# Patient Record
Sex: FEMALE | Race: WHITE | NOT HISPANIC OR LATINO
[De-identification: names, ages, dates, MRNs, and addresses within clinical notes are randomized per-mention and may not be internally consistent; named-entity substitution may affect disease eponyms.]

---

## 2019-11-09 ENCOUNTER — APPOINTMENT (OUTPATIENT)
Dept: OBGYN | Facility: CLINIC | Age: 29
End: 2019-11-09
Payer: COMMERCIAL

## 2019-11-09 PROCEDURE — 99385 PREV VISIT NEW AGE 18-39: CPT

## 2019-11-09 PROCEDURE — 81025 URINE PREGNANCY TEST: CPT

## 2019-11-09 PROCEDURE — 36415 COLL VENOUS BLD VENIPUNCTURE: CPT

## 2019-11-20 PROBLEM — Z00.00 ENCOUNTER FOR PREVENTIVE HEALTH EXAMINATION: Status: ACTIVE | Noted: 2019-11-20

## 2019-11-21 ENCOUNTER — APPOINTMENT (OUTPATIENT)
Dept: OBGYN | Facility: CLINIC | Age: 29
End: 2019-11-21
Payer: COMMERCIAL

## 2019-11-21 PROCEDURE — 0502F SUBSEQUENT PRENATAL CARE: CPT

## 2019-11-26 ENCOUNTER — APPOINTMENT (OUTPATIENT)
Dept: OBGYN | Facility: CLINIC | Age: 29
End: 2019-11-26

## 2019-12-03 ENCOUNTER — APPOINTMENT (OUTPATIENT)
Dept: OBGYN | Facility: CLINIC | Age: 29
End: 2019-12-03
Payer: COMMERCIAL

## 2019-12-03 ENCOUNTER — APPOINTMENT (OUTPATIENT)
Dept: ANTEPARTUM | Facility: CLINIC | Age: 29
End: 2019-12-03

## 2019-12-03 PROCEDURE — 0502F SUBSEQUENT PRENATAL CARE: CPT

## 2020-01-04 ENCOUNTER — APPOINTMENT (OUTPATIENT)
Dept: ANTEPARTUM | Facility: CLINIC | Age: 30
End: 2020-01-04
Payer: COMMERCIAL

## 2020-01-04 PROCEDURE — 76817 TRANSVAGINAL US OBSTETRIC: CPT | Mod: 59

## 2020-01-04 PROCEDURE — 76811 OB US DETAILED SNGL FETUS: CPT

## 2020-02-03 ENCOUNTER — APPOINTMENT (OUTPATIENT)
Dept: OBGYN | Facility: CLINIC | Age: 30
End: 2020-02-03

## 2020-02-25 ENCOUNTER — APPOINTMENT (OUTPATIENT)
Dept: OBGYN | Facility: CLINIC | Age: 30
End: 2020-02-25

## 2020-03-04 ENCOUNTER — OUTPATIENT (OUTPATIENT)
Dept: INPATIENT UNIT | Facility: HOSPITAL | Age: 30
LOS: 1 days | Discharge: HOME | End: 2020-03-04

## 2020-03-04 VITALS
TEMPERATURE: 98 F | OXYGEN SATURATION: 99 % | SYSTOLIC BLOOD PRESSURE: 112 MMHG | RESPIRATION RATE: 16 BRPM | HEART RATE: 84 BPM | DIASTOLIC BLOOD PRESSURE: 57 MMHG

## 2020-03-04 VITALS — DIASTOLIC BLOOD PRESSURE: 58 MMHG | SYSTOLIC BLOOD PRESSURE: 103 MMHG | HEART RATE: 79 BPM

## 2020-03-04 DIAGNOSIS — O26.899 OTHER SPECIFIED PREGNANCY RELATED CONDITIONS, UNSPECIFIED TRIMESTER: ICD-10-CM

## 2020-03-04 DIAGNOSIS — Z3A.00 WEEKS OF GESTATION OF PREGNANCY NOT SPECIFIED: ICD-10-CM

## 2020-03-04 LAB
APPEARANCE UR: SIGNIFICANT CHANGE UP
APTT BLD: 27.7 SEC — SIGNIFICANT CHANGE UP (ref 27.5–36.3)
BACTERIA # UR AUTO: HIGH
BASOPHILS # BLD AUTO: 0.04 K/UL — SIGNIFICANT CHANGE UP (ref 0–0.2)
BASOPHILS NFR BLD AUTO: 0.3 % — SIGNIFICANT CHANGE UP (ref 0–2)
BILIRUB UR-MCNC: NEGATIVE — SIGNIFICANT CHANGE UP
BLOOD UR QL VISUAL: NEGATIVE — SIGNIFICANT CHANGE UP
COLOR SPEC: YELLOW — SIGNIFICANT CHANGE UP
EOSINOPHIL # BLD AUTO: 0.29 K/UL — SIGNIFICANT CHANGE UP (ref 0–0.5)
EOSINOPHIL NFR BLD AUTO: 2.3 % — SIGNIFICANT CHANGE UP (ref 0–6)
FIBRINOGEN PPP-MCNC: 682 MG/DL — HIGH (ref 350–510)
GLUCOSE UR-MCNC: NEGATIVE — SIGNIFICANT CHANGE UP
HCT VFR BLD CALC: 36.2 % — SIGNIFICANT CHANGE UP (ref 34.5–45)
HGB BLD-MCNC: 11.7 G/DL — SIGNIFICANT CHANGE UP (ref 11.5–15.5)
IMM GRANULOCYTES NFR BLD AUTO: 0.5 % — SIGNIFICANT CHANGE UP (ref 0–1.5)
INR BLD: 0.93 — SIGNIFICANT CHANGE UP (ref 0.88–1.17)
KETONES UR-MCNC: NEGATIVE — SIGNIFICANT CHANGE UP
LEUKOCYTE ESTERASE UR-ACNC: SIGNIFICANT CHANGE UP
LYMPHOCYTES # BLD AUTO: 17.9 % — SIGNIFICANT CHANGE UP (ref 13–44)
LYMPHOCYTES # BLD AUTO: 2.28 K/UL — SIGNIFICANT CHANGE UP (ref 1–3.3)
MCHC RBC-ENTMCNC: 29.7 PG — SIGNIFICANT CHANGE UP (ref 27–34)
MCHC RBC-ENTMCNC: 32.3 % — SIGNIFICANT CHANGE UP (ref 32–36)
MCV RBC AUTO: 91.9 FL — SIGNIFICANT CHANGE UP (ref 80–100)
MONOCYTES # BLD AUTO: 0.9 K/UL — SIGNIFICANT CHANGE UP (ref 0–0.9)
MONOCYTES NFR BLD AUTO: 7.1 % — SIGNIFICANT CHANGE UP (ref 2–14)
NEUTROPHILS # BLD AUTO: 9.19 K/UL — HIGH (ref 1.8–7.4)
NEUTROPHILS NFR BLD AUTO: 71.9 % — SIGNIFICANT CHANGE UP (ref 43–77)
NITRITE UR-MCNC: NEGATIVE — SIGNIFICANT CHANGE UP
NRBC # FLD: 0 K/UL — SIGNIFICANT CHANGE UP (ref 0–0)
PH UR: 6.5 — SIGNIFICANT CHANGE UP (ref 5–8)
PLATELET # BLD AUTO: 258 K/UL — SIGNIFICANT CHANGE UP (ref 150–400)
PMV BLD: 10.5 FL — SIGNIFICANT CHANGE UP (ref 7–13)
PROT UR-MCNC: 20 — SIGNIFICANT CHANGE UP
PROTHROM AB SERPL-ACNC: 10.7 SEC — SIGNIFICANT CHANGE UP (ref 9.8–13.1)
RBC # BLD: 3.94 M/UL — SIGNIFICANT CHANGE UP (ref 3.8–5.2)
RBC # FLD: 13.7 % — SIGNIFICANT CHANGE UP (ref 10.3–14.5)
RBC CASTS # UR COMP ASSIST: SIGNIFICANT CHANGE UP (ref 0–?)
SP GR SPEC: 1.02 — SIGNIFICANT CHANGE UP (ref 1–1.04)
SQUAMOUS # UR AUTO: SIGNIFICANT CHANGE UP
UROBILINOGEN FLD QL: NORMAL — SIGNIFICANT CHANGE UP
WBC # BLD: 12.76 K/UL — HIGH (ref 3.8–10.5)
WBC # FLD AUTO: 12.76 K/UL — HIGH (ref 3.8–10.5)
WBC UR QL: HIGH (ref 0–?)

## 2020-03-04 RX ORDER — SODIUM CHLORIDE 9 MG/ML
1000 INJECTION, SOLUTION INTRAVENOUS ONCE
Refills: 0 | Status: DISCONTINUED | OUTPATIENT
Start: 2020-03-04 | End: 2020-03-19

## 2020-03-04 NOTE — OB PROVIDER TRIAGE NOTE - NSHPLABSRESULTS_GEN_ALL_CORE
Complete Blood Count + Automated Diff (03.04.20 @ 17:09)    Nucleated RBC #: 0 K/uL    WBC Count: 12.76 K/uL    RBC Count: 3.94 M/uL    Hemoglobin: 11.7 g/dL    Hematocrit: 36.2 %    Mean Cell Volume: 91.9 fL    Mean Cell Hemoglobin: 29.7 pg    Mean Cell Hemoglobin Conc: 32.3 %    Red Cell Distrib Width: 13.7 %    Platelet Count - Automated: 258 K/uL    MPV: 10.5 fl    Auto Neutrophil #: 9.19 K/uL    Auto Lymphocyte #: 2.28 K/uL    Auto Monocyte #: 0.90 K/uL    Auto Eosinophil #: 0.29 K/uL    Auto Basophil #: 0.04 K/uL    Auto Neutrophil %: 71.9 %    Auto Lymphocyte %: 17.9 %    Auto Monocyte %: 7.1 %    Auto Eosinophil %: 2.3 %    Auto Basophil %: 0.3 %    Auto Immature Granulocyte %: 0.5: (Includes meta, myelo and promyelocytes) %    Fibrinogen Assay (03.04.20 @ 17:09)    Fibrinogen Assay: 682.0 mg/dL    Blood Type = B Positive

## 2020-03-04 NOTE — OB PROVIDER TRIAGE NOTE - NSHPPHYSICALEXAM_GEN_ALL_CORE
A/O x 3  NAD  ICU Vital Signs Last 24 Hrs  T(C): 36.8 (04 Mar 2020 15:41), Max: 36.8 (04 Mar 2020 15:41)  T(F): 98.24 (04 Mar 2020 15:41), Max: 98.24 (04 Mar 2020 15:41)  HR: 82 (04 Mar 2020 16:46) (78 - 84)  BP: 92/54 (04 Mar 2020 16:46) (92/54 - 112/57)  BP(mean): --  ABP: --  ABP(mean): --  RR: 16 (04 Mar 2020 14:51) (16 - 16)  SpO2: 99% (04 Mar 2020 14:51) (99% - 99%)  Abdomen is soft NT and gravid with no ctx palpable  SSE:   FHR Initially: Cat 1 @ 1700  (135 baseline, mod variability , accels, no decels)  TOCO: No CTX  Transabdominal ultrasound at the bedside performed , images printed and results: A/O x 3  NAD  ICU Vital Signs Last 24 Hrs  T(C): 36.8 (04 Mar 2020 15:41), Max: 36.8 (04 Mar 2020 15:41)  T(F): 98.24 (04 Mar 2020 15:41), Max: 98.24 (04 Mar 2020 15:41)  HR: 82 (04 Mar 2020 16:46) (78 - 84)  BP: 92/54 (04 Mar 2020 16:46) (92/54 - 112/57)  BP(mean): --  ABP: --  ABP(mean): --  RR: 16 (04 Mar 2020 14:51) (16 - 16)  SpO2: 99% (04 Mar 2020 14:51) (99% - 99%)  Abdomen is soft NT and gravid with no ctx palpable  SSE:   FHR Initially: Cat 1 @ 1700  (135 baseline, mod variability , accels, no decels)  TOCO: No CTX A/O x 3  NAD  ICU Vital Signs Last 24 Hrs  T(C): 36.8 (04 Mar 2020 15:41), Max: 36.8 (04 Mar 2020 15:41)  T(F): 98.24 (04 Mar 2020 15:41), Max: 98.24 (04 Mar 2020 15:41)  HR: 82 (04 Mar 2020 16:46) (78 - 84)  BP: 92/54 (04 Mar 2020 16:46) (92/54 - 112/57)  BP(mean): --  ABP: --  ABP(mean): --  RR: 16 (04 Mar 2020 14:51) (16 - 16)  SpO2: 99% (04 Mar 2020 14:51) (99% - 99%)  Abdomen is soft NT and gravid with no ctx palpable  SSE: no pooling, cervix appears L/C  FHR Initially: Cat 1 @ 1700  (135 baseline, mod variability , accels, no decels)  TOCO: No CTX  TAS: VTX,  Anterior placenta, IRAM with MVP = 7.19, BPP 8/8

## 2020-03-04 NOTE — OB PROVIDER TRIAGE NOTE - NSOBPROVIDERNOTE_OBGYN_ALL_OB_FT
28 yo @ 28.5 GA ,   female presents with c/o of a slip and fall for 4 hours of continued observation on NST  - Labs CBC, Fibrinogen   - Blood Type:  B Positive   - BPP to be performed 28 yo @ 28.5 GA ,  [place] female presents with c/o of a slip and fall for 4 hours of continued observation on NST  - Labs CBC, Fibrinogen   - Blood Type:  B Positive   - BPP   - For continued observation 28 yo @ 28.5 GA ,  Mex[place] female presents with c/o of a slip and fall for 4 hours of continued observation on NST  - Labs CBC, Fibrinogen   - Blood Type:  B Positive   - BPP   - For continued observation  ______________________________________________________________  735pm    Pt received from Alberto,NANCY    Pt is a 30yo  at 28.5wks, presents to triage s/p fall. Pt denies any abd trauma. Pt was evaluated on day tour by [facility]. Pt reports abd pain at this time 7/10. Denies any vaginal bleeding or leakage of fluid. Reports good fetal movement. Denies any AP complications.    VS: BP 82/46, 85/60; pt denies feeling lightheaded or dizzy  Lungs clear bilaterally, normal HR and rhythm  Abd soft, nontender, gravid    -Coags sent  -UA  -IV hydration 1L ordered    TVUS:  NST: , moderate variability, accels, no decels  No ctx seen on toco  -Will continue to monitor 30 yo @ 28.5 GA ,  Australian female presents with c/o of a slip and fall for 4 hours of continued observation on NST  - Labs CBC, Fibrinogen   - Blood Type:  B Positive   - BPP   - For continued observation  ______________________________________________________________  735pm    Pt received from Alberto,NANCY    Pt is a 30yo  at 28.5wks, presents to triage s/p fall. Pt denies any abd trauma. [facility] Pt reports abd pain at this time 7/10. Denies any vaginal bleeding or leakage of fluid. Reports good fetal movement. Denies any AP complications.    VS: BP 82/46, 85/60; pt denies feeling lightheaded or dizzy  Lungs clear bilaterally, normal HR and rhythm  Abd soft, nontender, gravid    -Coags sent  -UA  -IV hydration 1L ordered    SSE: cervix appears closed; no blood noted in vault  TVUS: cervical length 4.45cm-4.66cm; no funneling or dynamic changes noted  NST: , moderate variability, accels, no decels  No ctx seen on toco  -Will continue to monitor 28 yo @ 28.5 GA ,   female presents with c/o of a slip and fall for 4 hours of continued observation on NST  - Labs CBC, Fibrinogen   - Blood Type:  B Positive   - BPP /  - For continued observation  ______________________________________________________________  735pm    Pt received from NANCY Dominguez    Pt is a 30yo  at 28.5wks, presents to triage s/p fall. Pt denies any abd trauma. [facility] Pt reports abd pain at this time 7/10. Denies any vaginal bleeding or leakage of fluid. Reports good fetal movement. Denies any AP complications.    VS: BP 82/46, 85/60; pt denies feeling lightheaded or dizzy  Lungs clear bilaterally, normal HR and rhythm  Abd soft, nontender, gravid    -Coags sent  -UA  -IV hydration 1L ordered    SSE: cervix appears closed; no blood noted in vault  TVUS: cervical length 4.45cm-4.66cm; no funneling or dynamic changes noted  NST: , moderate variability, accels, no decels  No ctx seen on toco  -Will continue to monitor    845pm    BP now: 103/58, HR 79    Lab Results:    -No evidence of fetal or maternal distress at this time  -All findings d/w Dr. Adams  -Pt cleared for discharge and to f/u with Dr. Adams as scheduled  -Pt to call MD or return to hospital if contractions, leakage of fluid, vaginal bleeding or decreased fetal movement 28 yo @ 28.5 GA ,   female presents with c/o of a slip and fall for 4 hours of continued observation on NST  - Labs CBC, Fibrinogen   - Blood Type:  B Positive   - BPP /  - For continued observation  ______________________________________________________________  735pm    Pt received from NANCY Dominguez    Pt is a 28yo  at 28.5wks, presents to triage s/p fall. Pt denies any abd trauma. [facility] Pt reports abd pain at this time 7/10. Denies any vaginal bleeding or leakage of fluid. Reports good fetal movement. Denies any AP complications.    VS: BP 82/46, 85/60; pt denies feeling lightheaded or dizzy  Lungs clear bilaterally, normal HR and rhythm  Abd soft, nontender, gravid    -Coags sent  -UA  -IV hydration 1L ordered    SSE: cervix appears closed; no blood noted in vault  TVUS: cervical length 4.45cm-4.66cm; no funneling or dynamic changes noted  NST: , moderate variability, accels, no decels  No ctx seen on toco  -Will continue to monitor    845pm    BP now: 103/58, HR 79      -No evidence of fetal or maternal distress at this time  -All findings d/w Dr. Adams  -Pt cleared for discharge and to f/u with Dr. Adams as scheduled  -Pt to call MD or return to hospital if contractions, leakage of fluid, vaginal bleeding or decreased fetal movement

## 2020-03-04 NOTE — OB PROVIDER TRIAGE NOTE - ADDITIONAL INSTRUCTIONS
-No evidence of fetal or maternal distress at this time  -All findings d/w Dr. Adams  -Pt cleared for discharge and to f/u with Dr. Adams as scheduled  -Pt to call MD or return to hospital if contractions, leakage of fluid, vaginal bleeding or decreased fetal movement

## 2020-03-04 NOTE — OB PROVIDER TRIAGE NOTE - HISTORY OF PRESENT ILLNESS
28 yo   female presents with c/o of a slip and fall on her buttocks while in a shower bath sitting on a stool and slipped hitting her buttocks. Patient states she did not hit her abdomen and denies any VB, LOF, Cramping or ctx since fall. States good FM's.   PNC: Dr Adams  Blood Type = B Positive (19)  Denies AP complications thus far

## 2020-03-06 LAB
CULTURE RESULTS: SIGNIFICANT CHANGE UP
SPECIMEN SOURCE: SIGNIFICANT CHANGE UP

## 2020-03-14 ENCOUNTER — APPOINTMENT (OUTPATIENT)
Dept: ANTEPARTUM | Facility: CLINIC | Age: 30
End: 2020-03-14
Payer: COMMERCIAL

## 2020-03-14 PROCEDURE — 76816 OB US FOLLOW-UP PER FETUS: CPT

## 2020-03-14 PROCEDURE — 76819 FETAL BIOPHYS PROFIL W/O NST: CPT

## 2020-03-25 PROBLEM — J32.9 CHRONIC SINUSITIS, UNSPECIFIED: Chronic | Status: ACTIVE | Noted: 2020-03-04

## 2020-03-25 PROBLEM — D64.9 ANEMIA, UNSPECIFIED: Chronic | Status: ACTIVE | Noted: 2020-03-04

## 2020-03-25 PROBLEM — O03.9 COMPLETE OR UNSPECIFIED SPONTANEOUS ABORTION WITHOUT COMPLICATION: Chronic | Status: ACTIVE | Noted: 2020-03-04

## 2020-03-31 ENCOUNTER — APPOINTMENT (OUTPATIENT)
Dept: OBGYN | Facility: CLINIC | Age: 30
End: 2020-03-31

## 2020-04-14 ENCOUNTER — APPOINTMENT (OUTPATIENT)
Dept: OBGYN | Facility: CLINIC | Age: 30
End: 2020-04-14

## 2020-04-24 ENCOUNTER — APPOINTMENT (OUTPATIENT)
Dept: OBGYN | Facility: CLINIC | Age: 30
End: 2020-04-24

## 2020-05-08 ENCOUNTER — APPOINTMENT (OUTPATIENT)
Dept: OBGYN | Facility: CLINIC | Age: 30
End: 2020-05-08

## 2020-05-12 ENCOUNTER — APPOINTMENT (OUTPATIENT)
Dept: OBGYN | Facility: CLINIC | Age: 30
End: 2020-05-12

## 2020-05-19 ENCOUNTER — APPOINTMENT (OUTPATIENT)
Dept: OBGYN | Facility: CLINIC | Age: 30
End: 2020-05-19
Payer: COMMERCIAL

## 2020-05-19 PROCEDURE — 0502F SUBSEQUENT PRENATAL CARE: CPT

## 2020-05-22 ENCOUNTER — TRANSCRIPTION ENCOUNTER (OUTPATIENT)
Age: 30
End: 2020-05-22

## 2020-05-22 ENCOUNTER — APPOINTMENT (OUTPATIENT)
Dept: OBGYN | Facility: CLINIC | Age: 30
End: 2020-05-22
Payer: COMMERCIAL

## 2020-05-22 ENCOUNTER — INPATIENT (INPATIENT)
Facility: HOSPITAL | Age: 30
LOS: 3 days | Discharge: HOME | End: 2020-05-26
Attending: OBSTETRICS & GYNECOLOGY | Admitting: OBSTETRICS & GYNECOLOGY
Payer: COMMERCIAL

## 2020-05-22 VITALS
RESPIRATION RATE: 16 BRPM | TEMPERATURE: 99 F | HEART RATE: 96 BPM | DIASTOLIC BLOOD PRESSURE: 58 MMHG | SYSTOLIC BLOOD PRESSURE: 120 MMHG

## 2020-05-22 DIAGNOSIS — O26.899 OTHER SPECIFIED PREGNANCY RELATED CONDITIONS, UNSPECIFIED TRIMESTER: ICD-10-CM

## 2020-05-22 DIAGNOSIS — Z3A.00 WEEKS OF GESTATION OF PREGNANCY NOT SPECIFIED: ICD-10-CM

## 2020-05-22 LAB
BASOPHILS # BLD AUTO: 0.03 K/UL — SIGNIFICANT CHANGE UP (ref 0–0.2)
BASOPHILS NFR BLD AUTO: 0.2 % — SIGNIFICANT CHANGE UP (ref 0–2)
BLD GP AB SCN SERPL QL: NEGATIVE — SIGNIFICANT CHANGE UP
EOSINOPHIL # BLD AUTO: 0.14 K/UL — SIGNIFICANT CHANGE UP (ref 0–0.5)
EOSINOPHIL NFR BLD AUTO: 1.1 % — SIGNIFICANT CHANGE UP (ref 0–6)
HCT VFR BLD CALC: 39.5 % — SIGNIFICANT CHANGE UP (ref 34.5–45)
HGB BLD-MCNC: 12.9 G/DL — SIGNIFICANT CHANGE UP (ref 11.5–15.5)
IMM GRANULOCYTES NFR BLD AUTO: 0.5 % — SIGNIFICANT CHANGE UP (ref 0–1.5)
LYMPHOCYTES # BLD AUTO: 18.9 % — SIGNIFICANT CHANGE UP (ref 13–44)
LYMPHOCYTES # BLD AUTO: 2.33 K/UL — SIGNIFICANT CHANGE UP (ref 1–3.3)
MCHC RBC-ENTMCNC: 29 PG — SIGNIFICANT CHANGE UP (ref 27–34)
MCHC RBC-ENTMCNC: 32.7 % — SIGNIFICANT CHANGE UP (ref 32–36)
MCV RBC AUTO: 88.8 FL — SIGNIFICANT CHANGE UP (ref 80–100)
MONOCYTES # BLD AUTO: 0.88 K/UL — SIGNIFICANT CHANGE UP (ref 0–0.9)
MONOCYTES NFR BLD AUTO: 7.1 % — SIGNIFICANT CHANGE UP (ref 2–14)
NEUTROPHILS # BLD AUTO: 8.9 K/UL — HIGH (ref 1.8–7.4)
NEUTROPHILS NFR BLD AUTO: 72.2 % — SIGNIFICANT CHANGE UP (ref 43–77)
NRBC # FLD: 0 K/UL — SIGNIFICANT CHANGE UP (ref 0–0)
PLATELET # BLD AUTO: 284 K/UL — SIGNIFICANT CHANGE UP (ref 150–400)
PMV BLD: 10.4 FL — SIGNIFICANT CHANGE UP (ref 7–13)
RBC # BLD: 4.45 M/UL — SIGNIFICANT CHANGE UP (ref 3.8–5.2)
RBC # FLD: 13.3 % — SIGNIFICANT CHANGE UP (ref 10.3–14.5)
RH IG SCN BLD-IMP: POSITIVE — SIGNIFICANT CHANGE UP
RH IG SCN BLD-IMP: POSITIVE — SIGNIFICANT CHANGE UP
WBC # BLD: 12.34 K/UL — HIGH (ref 3.8–10.5)
WBC # FLD AUTO: 12.34 K/UL — HIGH (ref 3.8–10.5)

## 2020-05-22 PROCEDURE — 59510 CESAREAN DELIVERY: CPT

## 2020-05-22 PROCEDURE — 76818 FETAL BIOPHYS PROFILE W/NST: CPT

## 2020-05-22 RX ORDER — OXYTOCIN 10 UNIT/ML
333.33 VIAL (ML) INJECTION
Qty: 20 | Refills: 0 | Status: DISCONTINUED | OUTPATIENT
Start: 2020-05-22 | End: 2020-05-23

## 2020-05-22 RX ORDER — AMPICILLIN TRIHYDRATE 250 MG
2 CAPSULE ORAL ONCE
Refills: 0 | Status: COMPLETED | OUTPATIENT
Start: 2020-05-22 | End: 2020-05-22

## 2020-05-22 RX ORDER — AMPICILLIN TRIHYDRATE 250 MG
1 CAPSULE ORAL EVERY 4 HOURS
Refills: 0 | Status: DISCONTINUED | OUTPATIENT
Start: 2020-05-22 | End: 2020-05-23

## 2020-05-22 RX ORDER — SODIUM CHLORIDE 9 MG/ML
1000 INJECTION, SOLUTION INTRAVENOUS
Refills: 0 | Status: DISCONTINUED | OUTPATIENT
Start: 2020-05-22 | End: 2020-05-23

## 2020-05-22 RX ADMIN — Medication 216 GRAM(S): at 23:05

## 2020-05-22 NOTE — OB PROVIDER H&P - ASSESSMENT
pmh: sinusitis   anemia  psh: denies  obhx: SAB x1  gynhx: denies    NKDA    Medications   PNV    Assessment  Vital Signs Last 24 Hrs  T(C): 37.0 (22 May 2020 18:24), Max: 37 (22 May 2020 18:10)  T(F): 98.6 (22 May 2020 18:24), Max: 98.6 (22 May 2020 18:10)  HR: 82 (22 May 2020 20:21) (82 - 96)  BP: 110/60 (22 May 2020 20:21) (95/52 - 120/58)  BP(mean): --  RR: 16 (22 May 2020 18:10) (16 - 16)  SpO2: --    regular heart rate; rhythm   lungs CTA     abdomen soft nontender gravid  (+) FHR; moderate variability; with accelerations; occasional late decelerations noted   irregular uterine contractions noted on tocometer    Vaginal Exam: closed     Biophysical Profile: 8/8  cephalic; anterior   Amniotic Fluid Index: 13.1cm    Plan  Admit to Labor and Delivery for Induction of Labor for Category 2 tracing  Routine Admission Labs  PO Cytotec for Induction of Labor  COVID 19 Swab Pending      D/w Dr. Shelby & Dr. Castañeda PGY-3

## 2020-05-22 NOTE — OB PROVIDER H&P - HISTORY OF PRESENT ILLNESS
29 year old  female  at 40.4 weeks gestation who was sent from office for Nonreactive NST and decreased fm  today   denied any ap issues denied any fetal issues   denied any covid 19 exposure or symptoms     med hx  anemia   surghx  denied  meds pnvqd  Ob hx SAB x1    gyn hx denied

## 2020-05-22 NOTE — OB PROVIDER TRIAGE NOTE - NSHPPHYSICALEXAM_GEN_ALL_CORE
pt seen upon arrival      placed on NST pt seen upon arrival       1830p placed on NST   for prolonged NST

## 2020-05-22 NOTE — OB PROVIDER TRIAGE NOTE - NSOBPROVIDERNOTE_OBGYN_ALL_OB_FT
29 year old female P0 at 40.4 weeks NR NST at office  decreased FM 29 year old female P0 at 40.4 weeks NR NST at office  decreased FM  placed on NST in triage 1830   awaiting prolonged NST 29 year old female P0 at 40.4 weeks NR NST at office  decreased FM  placed on NST in triage 1830   awaiting prolonged NST    addendum; 2030  admit to labor and delivery for category 2 tracing

## 2020-05-22 NOTE — OB PROVIDER H&P - NSHPPHYSICALEXAM_GEN_ALL_CORE
Vital Signs Last 24 Hrs  T(C): 37.0 (22 May 2020 18:24), Max: 37 (22 May 2020 18:10)  T(F): 98.6 (22 May 2020 18:24), Max: 98.6 (22 May 2020 18:10)  HR: 82 (22 May 2020 20:21) (82 - 96)  BP: 110/60 (22 May 2020 20:21) (95/52 - 120/58)  BP(mean): --  RR: 16 (22 May 2020 18:10) (16 - 16)  SpO2: --    regular heart rate; rhythm   lungs CTA     abdomen soft nontender gravid  (+) FHR; moderate variability; with accelerations; occasional late decelerations noted   irregular uterine contractions noted on tocometer    Vaginal Exam: closed     Biophysical Profile: 8/8  cephalic; anterior   Amniotic Fluid Index: 13.1cm

## 2020-05-23 ENCOUNTER — TRANSCRIPTION ENCOUNTER (OUTPATIENT)
Age: 30
End: 2020-05-23

## 2020-05-23 ENCOUNTER — RESULT REVIEW (OUTPATIENT)
Age: 30
End: 2020-05-23

## 2020-05-23 PROCEDURE — 88307 TISSUE EXAM BY PATHOLOGIST: CPT | Mod: 26

## 2020-05-23 RX ORDER — ONDANSETRON 8 MG/1
4 TABLET, FILM COATED ORAL EVERY 6 HOURS
Refills: 0 | Status: DISCONTINUED | OUTPATIENT
Start: 2020-05-23 | End: 2020-05-26

## 2020-05-23 RX ORDER — FAMOTIDINE 10 MG/ML
20 INJECTION INTRAVENOUS ONCE
Refills: 0 | Status: COMPLETED | OUTPATIENT
Start: 2020-05-23 | End: 2020-05-23

## 2020-05-23 RX ORDER — ACETAMINOPHEN 500 MG
975 TABLET ORAL
Refills: 0 | Status: DISCONTINUED | OUTPATIENT
Start: 2020-05-23 | End: 2020-05-26

## 2020-05-23 RX ORDER — IBUPROFEN 200 MG
600 TABLET ORAL EVERY 6 HOURS
Refills: 0 | Status: COMPLETED | OUTPATIENT
Start: 2020-05-23 | End: 2021-04-21

## 2020-05-23 RX ORDER — OXYTOCIN 10 UNIT/ML
333.33 VIAL (ML) INJECTION
Qty: 20 | Refills: 0 | Status: COMPLETED | OUTPATIENT
Start: 2020-05-23 | End: 2020-05-23

## 2020-05-23 RX ORDER — BUTORPHANOL TARTRATE 2 MG/ML
2 INJECTION, SOLUTION INTRAMUSCULAR; INTRAVENOUS ONCE
Refills: 0 | Status: DISCONTINUED | OUTPATIENT
Start: 2020-05-23 | End: 2020-05-23

## 2020-05-23 RX ORDER — DIPHENHYDRAMINE HCL 50 MG
25 CAPSULE ORAL EVERY 6 HOURS
Refills: 0 | Status: DISCONTINUED | OUTPATIENT
Start: 2020-05-23 | End: 2020-05-26

## 2020-05-23 RX ORDER — MORPHINE SULFATE 50 MG/1
3 CAPSULE, EXTENDED RELEASE ORAL ONCE
Refills: 0 | Status: DISCONTINUED | OUTPATIENT
Start: 2020-05-23 | End: 2020-05-26

## 2020-05-23 RX ORDER — TETANUS TOXOID, REDUCED DIPHTHERIA TOXOID AND ACELLULAR PERTUSSIS VACCINE, ADSORBED 5; 2.5; 8; 8; 2.5 [IU]/.5ML; [IU]/.5ML; UG/.5ML; UG/.5ML; UG/.5ML
0.5 SUSPENSION INTRAMUSCULAR ONCE
Refills: 0 | Status: DISCONTINUED | OUTPATIENT
Start: 2020-05-23 | End: 2020-05-26

## 2020-05-23 RX ORDER — OXYCODONE HYDROCHLORIDE 5 MG/1
5 TABLET ORAL ONCE
Refills: 0 | Status: DISCONTINUED | OUTPATIENT
Start: 2020-05-23 | End: 2020-05-26

## 2020-05-23 RX ORDER — OXYTOCIN 10 UNIT/ML
333.33 VIAL (ML) INJECTION
Qty: 20 | Refills: 0 | Status: DISCONTINUED | OUTPATIENT
Start: 2020-05-23 | End: 2020-05-26

## 2020-05-23 RX ORDER — AMPICILLIN TRIHYDRATE 250 MG
1 CAPSULE ORAL EVERY 4 HOURS
Refills: 0 | Status: DISCONTINUED | OUTPATIENT
Start: 2020-05-23 | End: 2020-05-23

## 2020-05-23 RX ORDER — CITRIC ACID/SODIUM CITRATE 300-500 MG
30 SOLUTION, ORAL ORAL ONCE
Refills: 0 | Status: COMPLETED | OUTPATIENT
Start: 2020-05-23 | End: 2020-05-23

## 2020-05-23 RX ORDER — OXYCODONE HYDROCHLORIDE 5 MG/1
5 TABLET ORAL
Refills: 0 | Status: DISCONTINUED | OUTPATIENT
Start: 2020-05-23 | End: 2020-05-26

## 2020-05-23 RX ORDER — MAGNESIUM HYDROXIDE 400 MG/1
30 TABLET, CHEWABLE ORAL
Refills: 0 | Status: DISCONTINUED | OUTPATIENT
Start: 2020-05-23 | End: 2020-05-26

## 2020-05-23 RX ORDER — OXYCODONE HYDROCHLORIDE 5 MG/1
10 TABLET ORAL
Refills: 0 | Status: DISCONTINUED | OUTPATIENT
Start: 2020-05-23 | End: 2020-05-23

## 2020-05-23 RX ORDER — METOCLOPRAMIDE HCL 10 MG
10 TABLET ORAL ONCE
Refills: 0 | Status: DISCONTINUED | OUTPATIENT
Start: 2020-05-23 | End: 2020-05-26

## 2020-05-23 RX ORDER — NALOXONE HYDROCHLORIDE 4 MG/.1ML
0.1 SPRAY NASAL
Refills: 0 | Status: DISCONTINUED | OUTPATIENT
Start: 2020-05-23 | End: 2020-05-26

## 2020-05-23 RX ORDER — LANOLIN
1 OINTMENT (GRAM) TOPICAL EVERY 6 HOURS
Refills: 0 | Status: DISCONTINUED | OUTPATIENT
Start: 2020-05-23 | End: 2020-05-26

## 2020-05-23 RX ORDER — SODIUM CHLORIDE 9 MG/ML
1000 INJECTION, SOLUTION INTRAVENOUS
Refills: 0 | Status: DISCONTINUED | OUTPATIENT
Start: 2020-05-23 | End: 2020-05-23

## 2020-05-23 RX ORDER — HYDROMORPHONE HYDROCHLORIDE 2 MG/ML
1 INJECTION INTRAMUSCULAR; INTRAVENOUS; SUBCUTANEOUS
Refills: 0 | Status: DISCONTINUED | OUTPATIENT
Start: 2020-05-23 | End: 2020-05-26

## 2020-05-23 RX ORDER — OXYCODONE HYDROCHLORIDE 5 MG/1
5 TABLET ORAL
Refills: 0 | Status: DISCONTINUED | OUTPATIENT
Start: 2020-05-23 | End: 2020-05-23

## 2020-05-23 RX ORDER — SODIUM CHLORIDE 9 MG/ML
1000 INJECTION, SOLUTION INTRAVENOUS
Refills: 0 | Status: DISCONTINUED | OUTPATIENT
Start: 2020-05-23 | End: 2020-05-26

## 2020-05-23 RX ORDER — SIMETHICONE 80 MG/1
80 TABLET, CHEWABLE ORAL EVERY 4 HOURS
Refills: 0 | Status: DISCONTINUED | OUTPATIENT
Start: 2020-05-23 | End: 2020-05-26

## 2020-05-23 RX ADMIN — Medication 108 GRAM(S): at 03:26

## 2020-05-23 RX ADMIN — FAMOTIDINE 20 MILLIGRAM(S): 10 INJECTION INTRAVENOUS at 22:56

## 2020-05-23 RX ADMIN — Medication 108 GRAM(S): at 16:32

## 2020-05-23 RX ADMIN — Medication 108 GRAM(S): at 11:59

## 2020-05-23 RX ADMIN — Medication 0.25 MILLIGRAM(S): at 18:03

## 2020-05-23 RX ADMIN — Medication 30 MILLILITER(S): at 22:54

## 2020-05-23 RX ADMIN — BUTORPHANOL TARTRATE 2 MILLIGRAM(S): 2 INJECTION, SOLUTION INTRAMUSCULAR; INTRAVENOUS at 14:15

## 2020-05-23 RX ADMIN — Medication 108 GRAM(S): at 07:59

## 2020-05-23 RX ADMIN — Medication 108 GRAM(S): at 20:03

## 2020-05-23 NOTE — DISCHARGE NOTE OB - CARE PROVIDER_API CALL
Fady Adams  MATERNAL/FETAL MEDICINE  [id number] [address] [place] [address]  Phone: [phone]  Fax: [phone]  Follow Up Time:

## 2020-05-23 NOTE — DISCHARGE NOTE OB - PATIENT PORTAL LINK FT
You can access the FollowMyHealth Patient Portal offered by [facility] by registering at the following website: [email or web address]. By joining [facility]’s FollowMyHealth portal, you will also be able to view your health information using other applications (apps) compatible with our system.

## 2020-05-23 NOTE — DISCHARGE NOTE OB - MEDICATION SUMMARY - MEDICATIONS TO TAKE
I will START or STAY ON the medications listed below when I get home from the hospital:    acetaminophen 325 mg oral tablet  -- 3 tab(s) by mouth   -- Indication: For  delivery delivered    ibuprofen 600 mg oral tablet  -- 1 tab(s) by mouth every 6 hours  -- Indication: For  delivery delivered    oxyCODONE 5 mg oral tablet  -- 1 tab(s) by mouth every 4 to 6 hours, As Needed -Moderate to Severe Pain (4-10) MDD:6 tabs  -- Indication: For  delivery delivered

## 2020-05-23 NOTE — CHART NOTE - NSCHARTNOTEFT_GEN_A_CORE
AN  Variable decel.   Continue to monitor.  Reevaluate ctx pattern prior to next dose of cytotec.  Discussed with patient nurse.  JESSICA Shelby

## 2020-05-23 NOTE — CHART NOTE - NSCHARTNOTEFT_GEN_A_CORE
service attending    ve Fd/0  fht 130 moderate variability late decelerations noted  terbutaline 0.25mg sq given x 1\  toco q 2-3 min  a/p cat 2 fht  O2  position changed  Dr. Shelby advised of FHT and exam  will consider epi if fht remains stable    Romeo CARVALHO

## 2020-05-23 NOTE — OB PROVIDER DELIVERY SUMMARY - NSPROVIDERDELIVERYNOTE_OBGYN_ALL_OB_FT
primary LTCS for cat 2 tracing, uncomplicated  viable male infant, vertex presentation, Apgars 8/9, cord gasses sent  Grossly normal fallopian tubes, uterus, and ovaries    EBL: 239  IVF: 1500  UOP: 250 primary LTCS for cat 2 tracing, uncomplicated  viable male infant, vertex presentation, Apgars 8/9, cord gasses sent, 3420g  Grossly normal fallopian tubes, uterus, and ovaries    EBL: 239  IVF: 1500  UOP: 250

## 2020-05-23 NOTE — CHART NOTE - NSCHARTNOTEFT_GEN_A_CORE
service ObGyn Attending MD Romeo    pt down from 4T for late decelerations  pt uncomfortable  ve 8/100/0  fht 120 moderate variability few late decels noted discontinuous  toco couplets q 3 min  a/p cat 2 fht  lateral position  O2  ISE applied  anesthesia called for epi consult  IVF bolus  amp for GBS  Dr. Shelby en route    Romeo CARVALHO

## 2020-05-23 NOTE — CHART NOTE - NSCHARTNOTEFT_GEN_A_CORE
R1 Labor Note    VS  T(C): 36.6 (20 @ 10:11)  HR: 82 (20 @ 13:17)  BP: 109/61 (20 @ 10:11)  RR: 17 (20 @ 10:11)  SpO2: 100% (20 @ 13:17)    SVE: 0.5/30/-3  FHT: 130 bpm, mod madyson, +acel, -decel  [place]: coupling q4h    Plan:  IOL Cat 2  Stadol x1  FHT Cat 1  Expect     d/w Dr. Afsaneh Jorgensen PGY1

## 2020-05-23 NOTE — CHART NOTE - NSCHARTNOTEFT_GEN_A_CORE
R1 Labor Note    VS  T(C): 36.6 (20 @ 10:11)  HR: 88 (20 @ 16:48)  BP: 109/66 (20 @ 16:47)  RR: 17 (20 @ 10:11)  SpO2: 100% (20 @ 16:48)    SVE: 4/90/-3  FHT: 120 bpm, min-mod madyson, +acel, +intermittent late decelerations   [place]: q1-4, irregular    Plan:  IOL Cat 2  FHT Cat 2 Resuscitation measures initiated including repositioning  spPO  transfer downstairs and call 4 epi->SVE->likely pit if tracing improves  Expect     d/w Dr. Geronimo  to be d/w Dr. Afsaneh Jorgensen PGY1

## 2020-05-24 LAB
BASOPHILS # BLD AUTO: 0.04 K/UL — SIGNIFICANT CHANGE UP (ref 0–0.2)
BASOPHILS NFR BLD AUTO: 0.2 % — SIGNIFICANT CHANGE UP (ref 0–2)
EOSINOPHIL # BLD AUTO: 0.02 K/UL — SIGNIFICANT CHANGE UP (ref 0–0.5)
EOSINOPHIL NFR BLD AUTO: 0.1 % — SIGNIFICANT CHANGE UP (ref 0–6)
HCT VFR BLD CALC: 38 % — SIGNIFICANT CHANGE UP (ref 34.5–45)
HGB BLD-MCNC: 12.4 G/DL — SIGNIFICANT CHANGE UP (ref 11.5–15.5)
IMM GRANULOCYTES NFR BLD AUTO: 0.6 % — SIGNIFICANT CHANGE UP (ref 0–1.5)
LYMPHOCYTES # BLD AUTO: 1.21 K/UL — SIGNIFICANT CHANGE UP (ref 1–3.3)
LYMPHOCYTES # BLD AUTO: 6 % — LOW (ref 13–44)
MCHC RBC-ENTMCNC: 29.5 PG — SIGNIFICANT CHANGE UP (ref 27–34)
MCHC RBC-ENTMCNC: 32.6 % — SIGNIFICANT CHANGE UP (ref 32–36)
MCV RBC AUTO: 90.5 FL — SIGNIFICANT CHANGE UP (ref 80–100)
MONOCYTES # BLD AUTO: 1.26 K/UL — HIGH (ref 0–0.9)
MONOCYTES NFR BLD AUTO: 6.2 % — SIGNIFICANT CHANGE UP (ref 2–14)
NEUTROPHILS # BLD AUTO: 17.67 K/UL — HIGH (ref 1.8–7.4)
NEUTROPHILS NFR BLD AUTO: 86.9 % — HIGH (ref 43–77)
NRBC # FLD: 0 K/UL — SIGNIFICANT CHANGE UP (ref 0–0)
PLATELET # BLD AUTO: 262 K/UL — SIGNIFICANT CHANGE UP (ref 150–400)
PMV BLD: 10.7 FL — SIGNIFICANT CHANGE UP (ref 7–13)
RBC # BLD: 4.2 M/UL — SIGNIFICANT CHANGE UP (ref 3.8–5.2)
RBC # FLD: 13.4 % — SIGNIFICANT CHANGE UP (ref 10.3–14.5)
SARS-COV-2 RNA SPEC QL NAA+PROBE: SIGNIFICANT CHANGE UP
WBC # BLD: 20.32 K/UL — HIGH (ref 3.8–10.5)
WBC # FLD AUTO: 20.32 K/UL — HIGH (ref 3.8–10.5)

## 2020-05-24 RX ORDER — SODIUM CHLORIDE 9 MG/ML
1000 INJECTION, SOLUTION INTRAVENOUS
Refills: 0 | Status: DISCONTINUED | OUTPATIENT
Start: 2020-05-24 | End: 2020-05-26

## 2020-05-24 RX ORDER — KETOROLAC TROMETHAMINE 30 MG/ML
30 SYRINGE (ML) INJECTION EVERY 6 HOURS
Refills: 0 | Status: DISCONTINUED | OUTPATIENT
Start: 2020-05-24 | End: 2020-05-25

## 2020-05-24 RX ORDER — HEPARIN SODIUM 5000 [USP'U]/ML
5000 INJECTION INTRAVENOUS; SUBCUTANEOUS EVERY 12 HOURS
Refills: 0 | Status: DISCONTINUED | OUTPATIENT
Start: 2020-05-24 | End: 2020-05-26

## 2020-05-24 RX ADMIN — Medication 975 MILLIGRAM(S): at 16:17

## 2020-05-24 RX ADMIN — HEPARIN SODIUM 5000 UNIT(S): 5000 INJECTION INTRAVENOUS; SUBCUTANEOUS at 21:10

## 2020-05-24 RX ADMIN — Medication 975 MILLIGRAM(S): at 23:05

## 2020-05-24 RX ADMIN — ONDANSETRON 4 MILLIGRAM(S): 8 TABLET, FILM COATED ORAL at 09:29

## 2020-05-24 RX ADMIN — Medication 30 MILLIGRAM(S): at 23:05

## 2020-05-24 RX ADMIN — Medication 975 MILLIGRAM(S): at 02:19

## 2020-05-24 RX ADMIN — Medication 30 MILLIGRAM(S): at 16:05

## 2020-05-24 RX ADMIN — Medication 30 MILLIGRAM(S): at 09:28

## 2020-05-24 RX ADMIN — SODIUM CHLORIDE 75 MILLILITER(S): 9 INJECTION, SOLUTION INTRAVENOUS at 02:20

## 2020-05-24 RX ADMIN — Medication 1000 MILLIUNIT(S)/MIN: at 00:21

## 2020-05-24 RX ADMIN — HEPARIN SODIUM 5000 UNIT(S): 5000 INJECTION INTRAVENOUS; SUBCUTANEOUS at 09:28

## 2020-05-24 NOTE — OB POSTPARTUM EVENT NOTE - NS_EVENTSUMMARY1_OBGYN_ALL_OB_FT
Patient is s/p c/s with delivery at 22:26.   Patient VSS, moderate bleeding noted, fundus firm and at U, pain within acceptable limits as per patient.   While sleeping, pulse ox readings were observed from 85-87. When waking patient, o2 saturation reading were above 93%. Pulse ox sticker was changed and reapplied, HOB was elevated and Incentive Spirometer was given to patient, educated on use and importance. Patient verbalized understanding and utilizing spirometer.

## 2020-05-24 NOTE — LACTATION INITIAL EVALUATION - INTERVENTION OUTCOME
nbn  sleepy   and gaggy  . no  latch at this  time  . encouraged safe  skin to  skin  ,  reviewed  with  mother  what to do  when  nbn  is gaggy . rn aware  of  visit and assessment  and  to follow .   offered assistance as needed./verbalizes understanding

## 2020-05-24 NOTE — PROGRESS NOTE ADULT - SUBJECTIVE AND OBJECTIVE BOX
Pt complains of fatigue and nausea earlier, unable to urinate  Vital Signs Last 24 Hrs  T(C): 36.4 (24 May 2020 09:12), Max: 36.7 (23 May 2020 17:40)  T(F): 97.6 (24 May 2020 09:12), Max: 98.1 (23 May 2020 23:30)  HR: 83 (24 May 2020 09:12) (63 - 123)  BP: 102/52 (24 May 2020 09:12) (89/47 - 157/61)  BP(mean): 71 (24 May 2020 03:00) (50 - 76)  RR: 17 (24 May 2020 09:12) (14 - 26)  SpO2: 94% (24 May 2020 09:12) (86% - 100%)  Blood Type: B Positive                        12.4   20.32 )-----------( 262      ( 24 May 2020 06:20 )             38.0     abdomen - appropriately tender, mildly distended  incision - clean, dry, intact  extremeties - nontender  s/p c/s ppd#1  routine post op care  consider straight cath/sanchez if no urination in another 2 hours

## 2020-05-24 NOTE — OB NEONATOLOGY/PEDIATRICIAN DELIVERY SUMMARY - NSPEDSNEONOTESA_OBGYN_ALL_OB_FT
Baby is a 40.5 week GA male born to a 30 y/o  mother via CS for category II tracings. Maternal history complicated by anemia. Pregnancy uncomplicated. Maternal blood type B+. Prenatal labs neg/neg/NR/I. GBS positive, treated with amp x6 (adequate). SROM at 16:50 (<18hrs) with meconium stained fluid. Baby born vigorous and crying spontaneously. Warmed, dried, stimulated, suctioned. Apgars 8/9. Stool in OR. Mom consents to hep B, declines circumcision, plans to initiate breastfeeding. Highest temp 36.7 C, EOS 0.05. COVID NEGATIVE.    Baby stable for transfer to  nursery. Parents updated.

## 2020-05-24 NOTE — LACTATION INITIAL EVALUATION - LACTATION INTERVENTIONS
assisted with deep latch and positioning discussed  signs  of  effective  feeding and  swallowing.  discussed  compression at  breast when  nbn  stops  drinking  and  is  still sucking.  instructed  to offer both  breast at a feeding ,feed on cue and safe  skin to skin.  . reviewed  log  new beginnings   breastfeeding section/initiate hand expression routine

## 2020-05-24 NOTE — OB RN DELIVERY SUMMARY - NS_LABORCHARACTER_OBGYN_ALL_OB
Internal electronic FM/External electronic FM/Antibiotics in labor/Induction of labor-Medicinal/Meconium staining

## 2020-05-24 NOTE — OB RN DELIVERY SUMMARY - NS_SEPSISRSKCALC_OBGYN_ALL_OB_FT
EOS calculated successfully. EOS Risk Factor: 0.5/1000 live births ([facility] national incidence); GA=40w5d; Temp=98.6; ROM=5.6; GBS='Positive'; Antibiotics='GBS specific antibiotics > 2 hrs prior to birth'

## 2020-05-25 LAB — T PALLIDUM AB TITR SER: NEGATIVE — SIGNIFICANT CHANGE UP

## 2020-05-25 RX ORDER — IBUPROFEN 200 MG
600 TABLET ORAL EVERY 6 HOURS
Refills: 0 | Status: DISCONTINUED | OUTPATIENT
Start: 2020-05-25 | End: 2020-05-26

## 2020-05-25 RX ADMIN — Medication 975 MILLIGRAM(S): at 22:45

## 2020-05-25 RX ADMIN — HEPARIN SODIUM 5000 UNIT(S): 5000 INJECTION INTRAVENOUS; SUBCUTANEOUS at 10:04

## 2020-05-25 RX ADMIN — Medication 975 MILLIGRAM(S): at 10:03

## 2020-05-25 RX ADMIN — Medication 600 MILLIGRAM(S): at 22:45

## 2020-05-25 RX ADMIN — Medication 975 MILLIGRAM(S): at 17:25

## 2020-05-25 RX ADMIN — Medication 600 MILLIGRAM(S): at 10:03

## 2020-05-25 RX ADMIN — HEPARIN SODIUM 5000 UNIT(S): 5000 INJECTION INTRAVENOUS; SUBCUTANEOUS at 21:45

## 2020-05-25 RX ADMIN — Medication 600 MILLIGRAM(S): at 17:25

## 2020-05-25 NOTE — PROGRESS NOTE ADULT - SUBJECTIVE AND OBJECTIVE BOX
Pt complains of incisional pain. +flatus  Vital Signs Last 24 Hrs  T(C): 37.2 (25 May 2020 09:46), Max: 37.4 (24 May 2020 22:19)  T(F): 99 (25 May 2020 09:46), Max: 99.3 (24 May 2020 22:19)  HR: 103 (25 May 2020 09:46) (88 - 107)  BP: 121/59 (25 May 2020 09:46) (108/54 - 127/60)  BP(mean): --  RR: 18 (25 May 2020 09:46) (16 - 18)  SpO2: 96% (25 May 2020 09:46) (96% - 100%)                        12.4   20.32 )-----------( 262      ( 24 May 2020 06:20 )             38.0   Blood Type: B Positive  abdomen - soft, appropriately tender, non-distended  incision - clean, dry, intact  extremeties - nontender  s/p c/s  doing well - but difficulty getting in and out of bed due to pain tolerance  routine post op care  due to pandemic ob policy, patient's have been discharged pod #2, however this patient is not ready for discharge  continue current care

## 2020-05-26 VITALS
DIASTOLIC BLOOD PRESSURE: 68 MMHG | SYSTOLIC BLOOD PRESSURE: 107 MMHG | OXYGEN SATURATION: 99 % | RESPIRATION RATE: 18 BRPM | HEART RATE: 77 BPM | TEMPERATURE: 98 F

## 2020-05-26 RX ORDER — ACETAMINOPHEN 500 MG
3 TABLET ORAL
Qty: 0 | Refills: 0 | DISCHARGE
Start: 2020-05-26

## 2020-05-26 RX ORDER — IBUPROFEN 200 MG
1 TABLET ORAL
Qty: 0 | Refills: 0 | DISCHARGE
Start: 2020-05-26

## 2020-05-26 RX ORDER — OXYCODONE HYDROCHLORIDE 5 MG/1
1 TABLET ORAL
Qty: 20 | Refills: 0
Start: 2020-05-26

## 2020-05-26 RX ADMIN — HEPARIN SODIUM 5000 UNIT(S): 5000 INJECTION INTRAVENOUS; SUBCUTANEOUS at 09:21

## 2020-05-26 RX ADMIN — Medication 600 MILLIGRAM(S): at 12:33

## 2020-05-26 RX ADMIN — Medication 975 MILLIGRAM(S): at 09:21

## 2020-05-26 RX ADMIN — Medication 600 MILLIGRAM(S): at 17:28

## 2020-05-26 RX ADMIN — Medication 975 MILLIGRAM(S): at 14:59

## 2020-05-26 NOTE — PROGRESS NOTE ADULT - SUBJECTIVE AND OBJECTIVE BOX
Postpartum Note,  Section  She is a  29y woman who is now post-operative day: 3    Subjective:  The patient feels well.  She is ambulating.   She is tolerating regular diet.  She is voiding.  Her pain is controlled.  She reports normal postpartum bleeding.  +flatus, no BM    Physical exam:    Vital Signs Last 24 Hrs  T(C): 36.6 (26 May 2020 05:37), Max: 37.1 (25 May 2020 14:01)  T(F): 97.8 (26 May 2020 05:37), Max: 98.7 (25 May 2020 14:01)  HR: 74 (26 May 2020 05:37) (74 - 101)  BP: 101/68 (26 May 2020 05:37) (101/68 - 109/60)  BP(mean): --  RR: 18 (26 May 2020 05:37) (16 - 18)  SpO2: 95% (26 May 2020 05:37) (95% - 99%)    Gen: NAD  Abdomen: Soft, nontender, no distension , firm uterine fundus  Incision: Clean, dry, and intact   Ext: No calf tenderness      Allergies    No Known Allergies    Intolerances      MEDICATIONS  (STANDING):  acetaminophen   Tablet .. 975 milliGRAM(s) Oral <User Schedule>  diphtheria/tetanus/pertussis (acellular) Vaccine (ADAcel) 0.5 milliLiter(s) IntraMuscular once  heparin   Injectable 5000 Unit(s) SubCutaneous every 12 hours  ibuprofen  Tablet. 600 milliGRAM(s) Oral every 6 hours  lactated ringers. 1000 milliLiter(s) (75 mL/Hr) IV Continuous <Continuous>  lactated ringers. 1000 milliLiter(s) (125 mL/Hr) IV Continuous <Continuous>  morphine PF Epidural 3 milliGRAM(s) Epidural once  oxytocin Infusion 333.333 milliUNIT(s)/Min (1000 mL/Hr) IV Continuous <Continuous>    MEDICATIONS  (PRN):  diphenhydrAMINE 25 milliGRAM(s) Oral every 6 hours PRN Itching  HYDROmorphone  Injectable 1 milliGRAM(s) IV Push every 3 hours PRN Severe Pain (7 - 10)  lanolin Ointment 1 Application(s) Topical every 6 hours PRN Sore Nipples  magnesium hydroxide Suspension 30 milliLiter(s) Oral two times a day PRN Constipation  metoclopramide Injectable 10 milliGRAM(s) IV Push once PRN Nausea and/or Vomiting  naloxone Injectable 0.1 milliGRAM(s) IV Push every 3 minutes PRN For ANY of the following changes in patient status:  A. RR LESS THAN 10 breaths per minute, B. Oxygen saturation LESS THAN 90%, C. Sedation score of 6  ondansetron Injectable 4 milliGRAM(s) IV Push every 6 hours PRN Nausea  oxyCODONE    IR 5 milliGRAM(s) Oral every 3 hours PRN Moderate to Severe Pain (4-10)  oxyCODONE    IR 5 milliGRAM(s) Oral once PRN Moderate to Severe Pain (4-10)  simethicone 80 milliGRAM(s) Chew every 4 hours PRN Gas        Assessment and Plan  POD #3 s/p  section  Doing well.  Encourage ambulation  Continue current pain management  Continue SCD when in bed  d/c home

## 2020-05-29 ENCOUNTER — APPOINTMENT (OUTPATIENT)
Dept: OBGYN | Facility: CLINIC | Age: 30
End: 2020-05-29

## 2020-06-01 LAB — SURGICAL PATHOLOGY STUDY: SIGNIFICANT CHANGE UP

## 2020-06-09 ENCOUNTER — APPOINTMENT (OUTPATIENT)
Dept: OBGYN | Facility: CLINIC | Age: 30
End: 2020-06-09

## 2020-06-23 ENCOUNTER — APPOINTMENT (OUTPATIENT)
Dept: OBGYN | Facility: CLINIC | Age: 30
End: 2020-06-23

## 2020-06-26 ENCOUNTER — APPOINTMENT (OUTPATIENT)
Dept: OBGYN | Facility: CLINIC | Age: 30
End: 2020-06-26
Payer: COMMERCIAL

## 2020-06-26 PROCEDURE — 0503F POSTPARTUM CARE VISIT: CPT

## 2020-08-14 ENCOUNTER — APPOINTMENT (OUTPATIENT)
Dept: OBGYN | Facility: CLINIC | Age: 30
End: 2020-08-14
Payer: COMMERCIAL

## 2020-08-14 PROCEDURE — 0503F POSTPARTUM CARE VISIT: CPT
